# Patient Record
Sex: MALE | URBAN - METROPOLITAN AREA
[De-identification: names, ages, dates, MRNs, and addresses within clinical notes are randomized per-mention and may not be internally consistent; named-entity substitution may affect disease eponyms.]

---

## 2022-10-05 ENCOUNTER — APPOINTMENT (RX ONLY)
Dept: URBAN - METROPOLITAN AREA CLINIC 81 | Facility: CLINIC | Age: 77
Setting detail: DERMATOLOGY
End: 2022-10-05

## 2022-10-05 DIAGNOSIS — L85.3 XEROSIS CUTIS: ICD-10-CM

## 2022-10-05 PROCEDURE — ? PRESCRIPTION

## 2022-10-05 PROCEDURE — ? COUNSELING

## 2022-10-05 PROCEDURE — ? TREATMENT REGIMEN

## 2022-10-05 PROCEDURE — ? PRESCRIPTION MEDICATION MANAGEMENT

## 2022-10-05 PROCEDURE — 99203 OFFICE O/P NEW LOW 30 MIN: CPT

## 2022-10-05 RX ORDER — HYDROCORTISONE 25 MG/G
CREAM TOPICAL
Qty: 28 | Refills: 0 | Status: ERX | COMMUNITY
Start: 2022-10-05

## 2022-10-05 RX ORDER — TRIAMCINOLONE ACETONIDE 1 MG/G
CREAM TOPICAL BID
Qty: 80 | Refills: 2 | Status: ERX | COMMUNITY
Start: 2022-10-05

## 2022-10-05 RX ADMIN — TRIAMCINOLONE ACETONIDE: 1 CREAM TOPICAL at 00:00

## 2022-10-05 RX ADMIN — HYDROCORTISONE: 25 CREAM TOPICAL at 00:00

## 2022-10-05 ASSESSMENT — LOCATION SIMPLE DESCRIPTION DERM
LOCATION SIMPLE: LEFT FOREARM
LOCATION SIMPLE: RIGHT FOREARM

## 2022-10-05 ASSESSMENT — LOCATION ZONE DERM: LOCATION ZONE: ARM

## 2022-10-05 ASSESSMENT — LOCATION DETAILED DESCRIPTION DERM
LOCATION DETAILED: RIGHT PROXIMAL DORSAL FOREARM
LOCATION DETAILED: LEFT DISTAL DORSAL FOREARM

## 2022-10-05 NOTE — PROCEDURE: PRESCRIPTION MEDICATION MANAGEMENT
Render In Strict Bullet Format?: No
Initiate Treatment: Hydrocortisone 2.5% apply to face twice daily for one month\\nTriamcinolone- apply twice daily on arms for one month
Detail Level: Zone

## 2022-10-05 NOTE — HPI: RASH (ECZEMA)
How Severe Is Your Eczema?: moderate
Is This A New Presentation, Or A Follow-Up?: Rash
Additional History: Patient reports he has struggled with eczema on and off throughout his life but recently he states his arms and face have been very itchy, red, sensitive and they have began burning. Patient states his face becomes worse when he goes outside in the sun. Patient states he has been using cetaphil on his face and body but it only offers temporary relive never long term, and no oral medications like Benadryl or Allegra.

## 2022-10-05 NOTE — PROCEDURE: MIPS QUALITY
Quality 130: Documentation Of Current Medications In The Medical Record: Current Medications Documented
Detail Level: Detailed
Quality 110: Preventive Care And Screening: Influenza Immunization: Influenza Immunization Administered during Influenza season
Quality 226: Preventive Care And Screening: Tobacco Use: Screening And Cessation Intervention: Tobacco Screening not Performed for Medical Reasons
Quality 111:Pneumonia Vaccination Status For Older Adults: Pneumococcal vaccine (PPSV23) administered on or after patient’s 60th birthday and before the end of the measurement period

## 2022-10-05 NOTE — PROCEDURE: TREATMENT REGIMEN
Otc Regimen: Apply cerave moisturizer after shower on affected dry areas on body\\nL-Histadine : 4 grams once a day
Detail Level: Zone

## 2022-11-10 ENCOUNTER — APPOINTMENT (RX ONLY)
Dept: URBAN - METROPOLITAN AREA CLINIC 81 | Facility: CLINIC | Age: 77
Setting detail: DERMATOLOGY
End: 2022-11-10

## 2022-11-10 DIAGNOSIS — L57.0 ACTINIC KERATOSIS: ICD-10-CM

## 2022-11-10 DIAGNOSIS — T07XXXA ABRASION OR FRICTION BURN OF OTHER, MULTIPLE, AND UNSPECIFIED SITES, WITHOUT MENTION OF INFECTION: ICD-10-CM

## 2022-11-10 DIAGNOSIS — L85.3 XEROSIS CUTIS: ICD-10-CM

## 2022-11-10 PROBLEM — S00.402A UNSPECIFIED SUPERFICIAL INJURY OF LEFT EAR, INITIAL ENCOUNTER: Status: ACTIVE | Noted: 2022-11-10

## 2022-11-10 PROCEDURE — ? PRESCRIPTION MEDICATION MANAGEMENT

## 2022-11-10 PROCEDURE — ? ORDER TESTS

## 2022-11-10 PROCEDURE — ? LIQUID NITROGEN

## 2022-11-10 PROCEDURE — ? COUNSELING

## 2022-11-10 PROCEDURE — 17003 DESTRUCT PREMALG LES 2-14: CPT

## 2022-11-10 PROCEDURE — 99213 OFFICE O/P EST LOW 20 MIN: CPT | Mod: 25

## 2022-11-10 PROCEDURE — ? TREATMENT REGIMEN

## 2022-11-10 PROCEDURE — 17000 DESTRUCT PREMALG LESION: CPT

## 2022-11-10 ASSESSMENT — LOCATION DETAILED DESCRIPTION DERM
LOCATION DETAILED: 1ST WEB SPACE LEFT HAND
LOCATION DETAILED: RIGHT PROXIMAL DORSAL FOREARM
LOCATION DETAILED: RIGHT RADIAL DORSAL HAND
LOCATION DETAILED: LEFT DORSAL RING METACARPOPHALANGEAL JOINT
LOCATION DETAILED: LEFT DISTAL DORSAL FOREARM
LOCATION DETAILED: RIGHT MEDIAL FOREHEAD
LOCATION DETAILED: LEFT SUPERIOR HELIX
LOCATION DETAILED: RIGHT ULNAR DORSAL HAND

## 2022-11-10 ASSESSMENT — LOCATION SIMPLE DESCRIPTION DERM
LOCATION SIMPLE: LEFT HAND
LOCATION SIMPLE: RIGHT FOREHEAD
LOCATION SIMPLE: LEFT FOREARM
LOCATION SIMPLE: RIGHT FOREARM
LOCATION SIMPLE: RIGHT HAND
LOCATION SIMPLE: LEFT EAR

## 2022-11-10 ASSESSMENT — LOCATION ZONE DERM
LOCATION ZONE: ARM
LOCATION ZONE: HAND
LOCATION ZONE: EAR
LOCATION ZONE: FACE

## 2022-11-10 ASSESSMENT — PAIN INTENSITY VAS: HOW INTENSE IS YOUR PAIN 0 BEING NO PAIN, 10 BEING THE MOST SEVERE PAIN POSSIBLE?: NO PAIN

## 2022-11-10 NOTE — PROCEDURE: TREATMENT REGIMEN
Otc Regimen: Moisturizer after shower on affected dry areas on body (recommend Cerave).\\nL-Histadine : 4 grams once daily.
Detail Level: Zone

## 2022-11-10 NOTE — PROCEDURE: PRESCRIPTION MEDICATION MANAGEMENT
Render In Strict Bullet Format?: No
Continue Regimen: Hydrocortisone 2.5% - apply to face twice daily up to 10 days. Hold 1 week before reusing as as needed.\\nTriamcinolone- apply twice daily on arms up to 2 weeks. Hold 1 week before reusing as needed.
Detail Level: Zone

## 2022-11-16 ENCOUNTER — RX ONLY (OUTPATIENT)
Age: 77
Setting detail: RX ONLY
End: 2022-11-16

## 2022-11-16 RX ORDER — DOXYCYCLINE HYCLATE 100 MG/1
CAPSULE, GELATIN COATED ORAL BID
Qty: 20 | Refills: 0 | Status: ERX | COMMUNITY
Start: 2022-11-15